# Patient Record
Sex: FEMALE | Race: WHITE | NOT HISPANIC OR LATINO | Employment: FULL TIME | URBAN - METROPOLITAN AREA
[De-identification: names, ages, dates, MRNs, and addresses within clinical notes are randomized per-mention and may not be internally consistent; named-entity substitution may affect disease eponyms.]

---

## 2023-05-15 DIAGNOSIS — F32.A ANXIETY AND DEPRESSION: ICD-10-CM

## 2023-05-15 DIAGNOSIS — F41.9 ANXIETY AND DEPRESSION: ICD-10-CM

## 2023-05-15 DIAGNOSIS — G25.81 RESTLESS LEG SYNDROME: ICD-10-CM

## 2023-05-15 PROBLEM — J32.9 CHRONIC SINUSITIS: Status: ACTIVE | Noted: 2023-05-15

## 2023-05-15 PROBLEM — E78.5 HLD (HYPERLIPIDEMIA): Status: ACTIVE | Noted: 2023-05-15

## 2023-05-15 PROBLEM — G47.00 INSOMNIA: Status: ACTIVE | Noted: 2023-05-15

## 2023-05-15 PROBLEM — J44.9 COPD (CHRONIC OBSTRUCTIVE PULMONARY DISEASE) (MULTI): Status: ACTIVE | Noted: 2023-05-15

## 2023-05-15 PROBLEM — E66.01 MORBID OBESITY (MULTI): Status: ACTIVE | Noted: 2023-05-15

## 2023-05-15 PROBLEM — M19.019 OSTEOARTHRITIS OF STERNOCLAVICULAR JOINT: Status: ACTIVE | Noted: 2023-05-15

## 2023-05-15 PROBLEM — L73.2 HIDRADENITIS SUPPURATIVA: Status: ACTIVE | Noted: 2023-05-15

## 2023-05-15 PROBLEM — J01.90 ACUTE SINUS INFECTION: Status: ACTIVE | Noted: 2023-05-15

## 2023-05-15 PROBLEM — K21.9 GERD (GASTROESOPHAGEAL REFLUX DISEASE): Status: ACTIVE | Noted: 2023-05-15

## 2023-05-15 RX ORDER — CITALOPRAM 40 MG/1
40 TABLET, FILM COATED ORAL DAILY
COMMUNITY
End: 2023-05-15 | Stop reason: SDUPTHER

## 2023-05-15 RX ORDER — CITALOPRAM 40 MG/1
40 TABLET, FILM COATED ORAL DAILY
Qty: 30 TABLET | Refills: 0 | Status: SHIPPED | OUTPATIENT
Start: 2023-05-15 | End: 2023-07-13 | Stop reason: SDUPTHER

## 2023-05-15 RX ORDER — PRAMIPEXOLE DIHYDROCHLORIDE 0.5 MG/1
0.5 TABLET ORAL NIGHTLY
COMMUNITY
End: 2023-05-15 | Stop reason: SDUPTHER

## 2023-05-15 RX ORDER — PRAMIPEXOLE DIHYDROCHLORIDE 0.5 MG/1
0.5 TABLET ORAL NIGHTLY
Qty: 30 TABLET | Refills: 0 | Status: SHIPPED | OUTPATIENT
Start: 2023-05-15 | End: 2023-07-13 | Stop reason: SDUPTHER

## 2023-07-13 DIAGNOSIS — G47.00 INSOMNIA, UNSPECIFIED TYPE: ICD-10-CM

## 2023-07-13 DIAGNOSIS — G25.81 RESTLESS LEG SYNDROME: ICD-10-CM

## 2023-07-13 DIAGNOSIS — F41.9 ANXIETY AND DEPRESSION: ICD-10-CM

## 2023-07-13 DIAGNOSIS — F32.A ANXIETY AND DEPRESSION: ICD-10-CM

## 2023-07-13 RX ORDER — CITALOPRAM 40 MG/1
40 TABLET, FILM COATED ORAL DAILY
Qty: 30 TABLET | Refills: 0 | Status: SHIPPED | OUTPATIENT
Start: 2023-07-13 | End: 2023-08-16 | Stop reason: SDUPTHER

## 2023-07-13 RX ORDER — DIPHENHYDRAMINE HCL 25 MG
25 CAPSULE ORAL NIGHTLY PRN
Qty: 30 CAPSULE | Refills: 0 | Status: SHIPPED | OUTPATIENT
Start: 2023-07-13 | End: 2024-05-16 | Stop reason: WASHOUT

## 2023-07-13 RX ORDER — PRAMIPEXOLE DIHYDROCHLORIDE 0.5 MG/1
0.5 TABLET ORAL NIGHTLY
Qty: 30 TABLET | Refills: 0 | Status: SHIPPED | OUTPATIENT
Start: 2023-07-13 | End: 2023-08-16 | Stop reason: SDUPTHER

## 2023-07-13 RX ORDER — DIPHENHYDRAMINE HCL 25 MG
25 CAPSULE ORAL NIGHTLY PRN
COMMUNITY
Start: 2022-06-01 | End: 2023-07-13 | Stop reason: SDUPTHER

## 2023-07-13 RX ORDER — ALBUTEROL SULFATE 90 UG/1
2 AEROSOL, METERED RESPIRATORY (INHALATION) EVERY 4 HOURS PRN
COMMUNITY
End: 2023-08-16 | Stop reason: SDUPTHER

## 2023-07-13 NOTE — TELEPHONE ENCOUNTER
Needs to have citalopram (CeleXA) 40 mg tablet, pramipexole (Mirapex) 0.5 mg tablet, benedryl, protonix, and ventolin inhaler. Send to [facility]

## 2023-08-16 DIAGNOSIS — F32.A ANXIETY AND DEPRESSION: ICD-10-CM

## 2023-08-16 DIAGNOSIS — G25.81 RESTLESS LEG SYNDROME: ICD-10-CM

## 2023-08-16 DIAGNOSIS — K21.9 GASTROESOPHAGEAL REFLUX DISEASE, UNSPECIFIED WHETHER ESOPHAGITIS PRESENT: ICD-10-CM

## 2023-08-16 DIAGNOSIS — F41.9 ANXIETY AND DEPRESSION: ICD-10-CM

## 2023-08-16 DIAGNOSIS — J44.9 CHRONIC OBSTRUCTIVE PULMONARY DISEASE, UNSPECIFIED COPD TYPE (MULTI): ICD-10-CM

## 2023-08-16 RX ORDER — CITALOPRAM 40 MG/1
40 TABLET, FILM COATED ORAL DAILY
Qty: 90 TABLET | Refills: 0 | Status: SHIPPED | OUTPATIENT
Start: 2023-08-16 | End: 2024-03-08 | Stop reason: SDUPTHER

## 2023-08-16 RX ORDER — PRAMIPEXOLE DIHYDROCHLORIDE 0.5 MG/1
0.5 TABLET ORAL NIGHTLY
Qty: 90 TABLET | Refills: 0 | Status: SHIPPED | OUTPATIENT
Start: 2023-08-16 | End: 2024-03-08 | Stop reason: SDUPTHER

## 2023-08-16 RX ORDER — OMEPRAZOLE 20 MG/1
20 CAPSULE, DELAYED RELEASE ORAL
COMMUNITY
End: 2023-08-16 | Stop reason: SDUPTHER

## 2023-08-16 RX ORDER — OMEPRAZOLE 20 MG/1
20 CAPSULE, DELAYED RELEASE ORAL
Qty: 90 CAPSULE | Refills: 0 | Status: SHIPPED | OUTPATIENT
Start: 2023-08-16 | End: 2024-04-29 | Stop reason: SDUPTHER

## 2023-08-16 RX ORDER — ALBUTEROL SULFATE 90 UG/1
2 AEROSOL, METERED RESPIRATORY (INHALATION) EVERY 4 HOURS PRN
Qty: 18 G | Refills: 1 | Status: SHIPPED | OUTPATIENT
Start: 2023-08-16 | End: 2024-04-29 | Stop reason: SDUPTHER

## 2023-08-16 NOTE — TELEPHONE ENCOUNTER
Patient requesting refills on Citalopram,  Mirapex, Protonix, inhaler send to [facility] in [facility]

## 2024-03-08 DIAGNOSIS — F41.9 ANXIETY AND DEPRESSION: ICD-10-CM

## 2024-03-08 DIAGNOSIS — F32.A ANXIETY AND DEPRESSION: ICD-10-CM

## 2024-03-08 DIAGNOSIS — G25.81 RESTLESS LEG SYNDROME: ICD-10-CM

## 2024-03-08 RX ORDER — PRAMIPEXOLE DIHYDROCHLORIDE 0.5 MG/1
0.5 TABLET ORAL NIGHTLY
Qty: 90 TABLET | Refills: 0 | Status: SHIPPED | OUTPATIENT
Start: 2024-03-08 | End: 2024-04-29 | Stop reason: SDUPTHER

## 2024-03-08 RX ORDER — CITALOPRAM 40 MG/1
40 TABLET, FILM COATED ORAL DAILY
Qty: 90 TABLET | Refills: 0 | Status: SHIPPED | OUTPATIENT
Start: 2024-03-08 | End: 2024-04-29 | Stop reason: SDUPTHER

## 2024-03-08 NOTE — TELEPHONE ENCOUNTER
Rx Refill Request Telephone Encounter    Name:  Ivette Murray  :  [id number]  Medication Name:  pramipexole (Mirapex) 0.5 mg tablet   Medication Name: citalopram (CeleXA) 40 mg tablet   Specific Pharmacy location:  [facility] - [address]    Date of last appointment:  23  Date of next appointment:  24  Best number to reach patient:  [phone]

## 2024-04-26 ENCOUNTER — TELEPHONE (OUTPATIENT)
Dept: PRIMARY CARE | Facility: CLINIC | Age: 49
End: 2024-04-26

## 2024-04-26 ENCOUNTER — DOCUMENTATION (OUTPATIENT)
Dept: PRIMARY CARE | Facility: CLINIC | Age: 49
End: 2024-04-26

## 2024-04-26 NOTE — TELEPHONE ENCOUNTER
----- Message from Emiliana Isidro sent at 4/26/2024 10:26 AM EDT -----  Regarding: Phys/ER f/u  Pt called because she needs to reschedule her physical that she missed today. She also needs an hospital follow up. She would like to come Monday afternoon. Should I schedule these separate or together?

## 2024-04-26 NOTE — TELEPHONE ENCOUNTER
I scheduled her for Monday afternoon for her hospital follow up and told her Billie would schedule her px after her appt on Monday.

## 2024-04-26 NOTE — PROGRESS NOTES
Discharge Facility: [facility]  Discharge Diagnosis: Pneumonia due to infectious organism  Admission Date: 4/18/2024  Discharge Date: 4/25/2024    PCP Appointment Date:  -4/26/2024 1000    Hospital Encounter and Summary: Linked     Pt has PCP appt within 2 business days of discharge. No outreach attempt made.

## 2024-04-29 ENCOUNTER — OFFICE VISIT (OUTPATIENT)
Dept: PRIMARY CARE | Facility: CLINIC | Age: 49
End: 2024-04-29
Payer: COMMERCIAL

## 2024-04-29 VITALS
WEIGHT: 293 LBS | SYSTOLIC BLOOD PRESSURE: 120 MMHG | DIASTOLIC BLOOD PRESSURE: 86 MMHG | OXYGEN SATURATION: 95 % | BODY MASS INDEX: 47.09 KG/M2 | HEIGHT: 66 IN | TEMPERATURE: 97.3 F | HEART RATE: 83 BPM | RESPIRATION RATE: 14 BRPM

## 2024-04-29 DIAGNOSIS — F41.9 ANXIETY AND DEPRESSION: ICD-10-CM

## 2024-04-29 DIAGNOSIS — J44.9 CHRONIC OBSTRUCTIVE PULMONARY DISEASE, UNSPECIFIED COPD TYPE (MULTI): ICD-10-CM

## 2024-04-29 DIAGNOSIS — F32.A ANXIETY AND DEPRESSION: ICD-10-CM

## 2024-04-29 DIAGNOSIS — G25.81 RESTLESS LEG SYNDROME: ICD-10-CM

## 2024-04-29 DIAGNOSIS — K21.9 GASTROESOPHAGEAL REFLUX DISEASE, UNSPECIFIED WHETHER ESOPHAGITIS PRESENT: ICD-10-CM

## 2024-04-29 DIAGNOSIS — G47.09 OTHER INSOMNIA: ICD-10-CM

## 2024-04-29 DIAGNOSIS — R11.0 DAILY NAUSEA: Primary | ICD-10-CM

## 2024-04-29 DIAGNOSIS — I82.462 ACUTE DEEP VEIN THROMBOSIS (DVT) OF CALF MUSCLE VEIN OF LEFT LOWER EXTREMITY (MULTI): ICD-10-CM

## 2024-04-29 PROCEDURE — 99496 TRANSJ CARE MGMT HIGH F2F 7D: CPT | Performed by: INTERNAL MEDICINE

## 2024-04-29 RX ORDER — ENOXAPARIN SODIUM 150 MG/ML
150 INJECTION SUBCUTANEOUS EVERY 12 HOURS
COMMUNITY
Start: 2024-04-25 | End: 2024-04-30

## 2024-04-29 RX ORDER — CITALOPRAM 40 MG/1
40 TABLET, FILM COATED ORAL DAILY
Qty: 90 TABLET | Refills: 3 | Status: SHIPPED | OUTPATIENT
Start: 2024-04-29 | End: 2025-04-29

## 2024-04-29 RX ORDER — ALBUTEROL SULFATE 90 UG/1
2 AEROSOL, METERED RESPIRATORY (INHALATION) EVERY 4 HOURS PRN
Qty: 18 G | Refills: 1 | Status: SHIPPED | OUTPATIENT
Start: 2024-04-29

## 2024-04-29 RX ORDER — WARFARIN SODIUM 5 MG/1
5 TABLET ORAL
COMMUNITY
Start: 2024-04-29 | End: 2024-04-29 | Stop reason: SDUPTHER

## 2024-04-29 RX ORDER — BUDESONIDE AND FORMOTEROL FUMARATE DIHYDRATE 80; 4.5 UG/1; UG/1
2 AEROSOL RESPIRATORY (INHALATION) 2 TIMES DAILY
COMMUNITY
Start: 2024-04-25 | End: 2024-05-16 | Stop reason: SDUPTHER

## 2024-04-29 RX ORDER — ONDANSETRON 4 MG/1
4 TABLET, FILM COATED ORAL EVERY 8 HOURS PRN
COMMUNITY
Start: 2024-04-25 | End: 2024-04-29 | Stop reason: SDUPTHER

## 2024-04-29 RX ORDER — TRAZODONE HYDROCHLORIDE 50 MG/1
100 TABLET ORAL NIGHTLY PRN
Qty: 180 TABLET | Refills: 3 | Status: SHIPPED | OUTPATIENT
Start: 2024-04-29 | End: 2025-04-29

## 2024-04-29 RX ORDER — IPRATROPIUM BROMIDE AND ALBUTEROL SULFATE 2.5; .5 MG/3ML; MG/3ML
3 SOLUTION RESPIRATORY (INHALATION) EVERY 4 HOURS PRN
Qty: 180 ML | Refills: 3 | Status: SHIPPED | OUTPATIENT
Start: 2024-04-29

## 2024-04-29 RX ORDER — PRAMIPEXOLE DIHYDROCHLORIDE 0.5 MG/1
0.5 TABLET ORAL NIGHTLY
Qty: 90 TABLET | Refills: 3 | Status: SHIPPED | OUTPATIENT
Start: 2024-04-29 | End: 2025-04-29

## 2024-04-29 RX ORDER — IPRATROPIUM BROMIDE AND ALBUTEROL SULFATE 2.5; .5 MG/3ML; MG/3ML
3 SOLUTION RESPIRATORY (INHALATION) EVERY 4 HOURS PRN
COMMUNITY
Start: 2024-04-25 | End: 2024-04-29 | Stop reason: SDUPTHER

## 2024-04-29 RX ORDER — OMEPRAZOLE 20 MG/1
20 CAPSULE, DELAYED RELEASE ORAL
Qty: 90 CAPSULE | Refills: 3 | Status: SHIPPED | OUTPATIENT
Start: 2024-04-29 | End: 2025-04-29

## 2024-04-29 RX ORDER — AMOXICILLIN AND CLAVULANATE POTASSIUM 875; 125 MG/1; MG/1
875 TABLET, FILM COATED ORAL 2 TIMES DAILY
COMMUNITY
Start: 2024-04-25 | End: 2024-04-30

## 2024-04-29 RX ORDER — ONDANSETRON 4 MG/1
4 TABLET, FILM COATED ORAL EVERY 8 HOURS PRN
Qty: 20 TABLET | Refills: 3 | Status: SHIPPED | OUTPATIENT
Start: 2024-04-29

## 2024-04-29 RX ORDER — WARFARIN SODIUM 5 MG/1
5 TABLET ORAL EVERY OTHER DAY
Qty: 90 TABLET | Refills: 0 | Status: SHIPPED | OUTPATIENT
Start: 2024-04-29 | End: 2024-05-16 | Stop reason: ALTCHOICE

## 2024-04-29 NOTE — PROGRESS NOTES
"Subjective    Ivette Murray is a 49 y.o. female who presents for Hospital Follow-up.  HPI    [facility] follow up   Dx pneumonia, blood clot left leg   Discharged with Lovenox bid she has been on since last Monday  She is feeling much better    Warfarin 5mg daily   INR today at 3.3   Going to [facility]   Has one day left abx and prednisone.   She has not had her mamm or her colonoscopy    Needs refills   Discuss trazodone she cannot sleep. Menopause type sx.     Review of Systems   All other systems reviewed and are negative.        Objective     /86 (BP Location: Left arm, Patient Position: Sitting, BP Cuff Size: Adult)   Pulse 83   Temp 36.3 °C (97.3 °F) (Skin)   Resp 14   Ht 1.676 m (5' 6\")   Wt 135 kg (298 lb)   SpO2 95%   BMI 48.10 kg/m²    Physical Exam  Vitals reviewed.   Constitutional:       General: She is not in acute distress.     Appearance: Normal appearance.   Cardiovascular:      Rate and Rhythm: Normal rate and regular rhythm.      Pulses: Normal pulses.      Heart sounds: Normal heart sounds.   Pulmonary:      Effort: Pulmonary effort is normal.      Breath sounds: Normal breath sounds.   Abdominal:      Tenderness: There is no abdominal tenderness.   Musculoskeletal:         General: No swelling.      Comments: She has tenderness left calf   Skin:     General: Skin is warm and dry.   Neurological:      Mental Status: She is alert.       Health Maintenance Due   Topic Date Due    Echocardiogram  Never done    Yearly Adult Physical  Never done    HIV Screening  Never done    MMR Vaccines (1 of 1 - Standard series) Never done    Pneumococcal Vaccine: Pediatrics (0 to 5 Years) and At-Risk Patients (6 to 64 Years) (1 of 2 - PCV) Never done    Diabetes Screening  Never done    Hepatitis B Vaccines (1 of 3 - 19+ 3-dose series) Never done    DTaP/Tdap/Td Vaccines (1 - Tdap) Never done    Mammogram  Never done    Zoster Vaccines (1 of 2) Never done    Creatinine Level  05/03/2023    " Potassium Level  05/03/2023    COVID-19 Vaccine (4 - 2023-24 season) 09/01/2023    Cervical Cancer Screening  01/14/2024          Assessment/Plan   Problem List Items Addressed This Visit       Anxiety and depression    Relevant Medications    citalopram (CeleXA) 40 mg tablet    COPD (chronic obstructive pulmonary disease) (Multi)    Relevant Medications    albuterol (Ventolin HFA) 90 mcg/actuation inhaler    ipratropium-albuteroL (Duo-Neb) 0.5-2.5 mg/3 mL nebulizer solution    GERD (gastroesophageal reflux disease)    Relevant Medications    omeprazole (PriLOSEC) 20 mg DR capsule    Insomnia    Relevant Medications    traZODone (Desyrel) 50 mg tablet    Restless leg syndrome    Relevant Medications    pramipexole (Mirapex) 0.5 mg tablet     Other Visit Diagnoses       Daily nausea    -  Primary    Relevant Medications    ondansetron (Zofran) 4 mg tablet    Acute deep vein thrombosis (DVT) of calf muscle vein of left lower extremity (Multi)        Relevant Medications    warfarin (Coumadin) 5 mg tablet    cesar.stocking,knee,reg,xlrg misc        Can dc lovenox has been on for  a week.  Never had vte. No family hx of vte  Recommend compression stalkings.   Ok to go back to work  Her lungs sound good.   Recommend she get prevnar 20 she has had pneumonia multiple times.   Cont med  Call  with any problems or questions.   Follow up for physical in three months.

## 2024-04-30 ENCOUNTER — PATIENT OUTREACH (OUTPATIENT)
Dept: CARE COORDINATION | Facility: CLINIC | Age: 49
End: 2024-04-30
Payer: COMMERCIAL

## 2024-05-03 ENCOUNTER — TELEPHONE (OUTPATIENT)
Dept: PRIMARY CARE | Facility: CLINIC | Age: 49
End: 2024-05-03

## 2024-05-09 ENCOUNTER — PATIENT OUTREACH (OUTPATIENT)
Dept: CARE COORDINATION | Facility: CLINIC | Age: 49
End: 2024-05-09
Payer: COMMERCIAL

## 2024-05-09 NOTE — PROGRESS NOTES
Discharge Facility: [facility]  Discharge Diagnosis: Lung nodule  Admission Date: 5/4/2024 (readmit)  Discharge Date: 5/7/2024    PCP Appointment Date:  -Unable to schedule d/t no contact; task sent to office to assist    Hospital Encounter and Summary: Linked     Unable to reach patient x2 attempts, voicemail left with call back number.

## 2024-05-16 ENCOUNTER — OFFICE VISIT (OUTPATIENT)
Dept: PRIMARY CARE | Facility: CLINIC | Age: 49
End: 2024-05-16
Payer: COMMERCIAL

## 2024-05-16 VITALS
WEIGHT: 293 LBS | TEMPERATURE: 98.1 F | DIASTOLIC BLOOD PRESSURE: 72 MMHG | SYSTOLIC BLOOD PRESSURE: 124 MMHG | HEART RATE: 103 BPM | OXYGEN SATURATION: 94 % | RESPIRATION RATE: 17 BRPM | HEIGHT: 66 IN | BODY MASS INDEX: 47.09 KG/M2

## 2024-05-16 DIAGNOSIS — I82.462 ACUTE DEEP VEIN THROMBOSIS (DVT) OF CALF MUSCLE VEIN OF LEFT LOWER EXTREMITY (MULTI): Primary | ICD-10-CM

## 2024-05-16 DIAGNOSIS — T14.8XXA HEMATOMA: ICD-10-CM

## 2024-05-16 DIAGNOSIS — R91.1 LUNG NODULE: ICD-10-CM

## 2024-05-16 DIAGNOSIS — K21.9 GASTROESOPHAGEAL REFLUX DISEASE, UNSPECIFIED WHETHER ESOPHAGITIS PRESENT: ICD-10-CM

## 2024-05-16 DIAGNOSIS — F51.01 PRIMARY INSOMNIA: ICD-10-CM

## 2024-05-16 DIAGNOSIS — J44.9 CHRONIC OBSTRUCTIVE PULMONARY DISEASE, UNSPECIFIED COPD TYPE (MULTI): ICD-10-CM

## 2024-05-16 PROCEDURE — 99214 OFFICE O/P EST MOD 30 MIN: CPT | Performed by: INTERNAL MEDICINE

## 2024-05-16 RX ORDER — ACETAMINOPHEN 500 MG
1000 TABLET ORAL EVERY 4 HOURS PRN
Qty: 60 TABLET | Refills: 5 | Status: SHIPPED | OUTPATIENT
Start: 2024-05-16 | End: 2024-05-26

## 2024-05-16 RX ORDER — BUDESONIDE AND FORMOTEROL FUMARATE DIHYDRATE 80; 4.5 UG/1; UG/1
2 AEROSOL RESPIRATORY (INHALATION)
Qty: 10.2 G | Refills: 0 | Status: SHIPPED | OUTPATIENT
Start: 2024-05-16

## 2024-05-16 NOTE — PROGRESS NOTES
"Subjective   Patient ID: Ivette Murray is a 49 y.o. female who presents for Follow-up.    HPI     Was in hospital 5/4/24-5/7/24 ([facility]) found lung nodule.  Hospital changed her medications.  Here for hospital follow up and advice.     Has been tapering down on cigarettes.  She is going to lung nodule clinic  She is on eliquis  She is breathing  she is only using o2 atnight  On eliquis.      Review of Systems   All other systems reviewed and are negative.      Objective   /72 (BP Location: Right arm, Patient Position: Sitting, BP Cuff Size: Large adult)   Pulse 103   Temp 36.7 °C (98.1 °F) (Skin)   Resp 17   Ht 1.676 m (5' 6\")   Wt 136 kg (299 lb)   SpO2 94%   BMI 48.26 kg/m²     Physical Exam  Vitals reviewed.   Constitutional:       General: She is not in acute distress.     Appearance: Normal appearance.   Cardiovascular:      Rate and Rhythm: Normal rate and regular rhythm.      Pulses: Normal pulses.      Heart sounds: Normal heart sounds.   Pulmonary:      Effort: Pulmonary effort is normal.      Breath sounds: Normal breath sounds.   Abdominal:      General: Abdomen is flat.      Palpations: Abdomen is soft. There is no mass.      Tenderness: There is no abdominal tenderness.      Comments: Resolving hematomas lower abdomen.   Musculoskeletal:         General: No swelling.   Skin:     General: Skin is warm and dry.   Neurological:      Mental Status: She is alert.         Assessment/Plan   Problem List Items Addressed This Visit             ICD-10-CM    COPD (chronic obstructive pulmonary disease) (Multi) J44.9    Relevant Medications    budesonide-formoteroL (Symbicort) 80-4.5 mcg/actuation inhaler    GERD (gastroesophageal reflux disease) K21.9    Insomnia G47.00     Other Visit Diagnoses         Codes    Acute deep vein thrombosis (DVT) of calf muscle vein of left lower extremity (Multi)    -  Primary I82.462    Relevant Medications    acetaminophen (Tylenol Extra Strength) 500 " mg tablet    Hematoma     T14.8XXA    Lung nodule     R91.1        Labs and imagining reviewed  She is improving off the coumadin now on eliquis.   Tylenol only for any pain.   Her hematomas are improving.  She has small lung nodule. Recommend she continue follow up with lung nodule clinic at [facility]  Recommend she stop smoking. She is working hard at  it  Cont her blood thinner.  Cont oxygen at night  Call  with any problems or questions.   Recommend she get her prevnar at the pharmacy  Follow up in July

## 2024-05-23 ENCOUNTER — PATIENT OUTREACH (OUTPATIENT)
Dept: CARE COORDINATION | Facility: CLINIC | Age: 49
End: 2024-05-23
Payer: COMMERCIAL

## 2024-06-05 DIAGNOSIS — G25.81 RESTLESS LEG SYNDROME: ICD-10-CM

## 2024-06-05 DIAGNOSIS — I82.462 ACUTE DEEP VEIN THROMBOSIS (DVT) OF CALF MUSCLE VEIN OF LEFT LOWER EXTREMITY (MULTI): ICD-10-CM

## 2024-06-05 NOTE — TELEPHONE ENCOUNTER
Pt needs the prescription rewritten for compression socks. The pharmacy told her the prescription needs to include the strength of the socks, and it needs to include two pairs.    Pt would also like to know the status of the [employer] papers she sent in last week.    Pt needs the following prescription refilled, she only has 1 left:  Medication Name:  apixaban (Eliquis) 5 mg tablet   Specific Pharmacy location:  [facility] [address] [place] [address]   Date of last appointment:  05/16/24  Date of next appointment:  07/31/24  Best number to reach patient:  [phone]

## 2024-07-31 ENCOUNTER — APPOINTMENT (OUTPATIENT)
Dept: PRIMARY CARE | Facility: CLINIC | Age: 49
End: 2024-07-31
Payer: COMMERCIAL

## 2024-08-14 ENCOUNTER — TELEPHONE (OUTPATIENT)
Dept: PRIMARY CARE | Facility: CLINIC | Age: 49
End: 2024-08-14
Payer: COMMERCIAL

## 2024-08-14 DIAGNOSIS — J44.9 CHRONIC OBSTRUCTIVE PULMONARY DISEASE, UNSPECIFIED COPD TYPE (MULTI): ICD-10-CM

## 2024-08-14 RX ORDER — ALBUTEROL SULFATE 90 UG/1
2 INHALANT RESPIRATORY (INHALATION) EVERY 4 HOURS PRN
Qty: 18 G | Refills: 1 | Status: SHIPPED | OUTPATIENT
Start: 2024-08-14

## 2024-08-14 RX ORDER — BUDESONIDE AND FORMOTEROL FUMARATE DIHYDRATE 80; 4.5 UG/1; UG/1
2 AEROSOL RESPIRATORY (INHALATION)
Qty: 10.2 G | Refills: 0 | Status: SHIPPED | OUTPATIENT
Start: 2024-08-14

## 2024-09-10 DIAGNOSIS — J44.9 CHRONIC OBSTRUCTIVE PULMONARY DISEASE, UNSPECIFIED COPD TYPE (MULTI): ICD-10-CM

## 2024-09-11 RX ORDER — DILTIAZEM HYDROCHLORIDE 60 MG/1
2 TABLET, FILM COATED ORAL 2 TIMES DAILY
Qty: 10.2 G | Refills: 11 | Status: SHIPPED | OUTPATIENT
Start: 2024-09-11

## 2024-09-17 DIAGNOSIS — J44.9 CHRONIC OBSTRUCTIVE PULMONARY DISEASE, UNSPECIFIED COPD TYPE (MULTI): ICD-10-CM

## 2024-09-17 RX ORDER — ALBUTEROL SULFATE 90 UG/1
2 INHALANT RESPIRATORY (INHALATION) EVERY 4 HOURS PRN
Qty: 18 G | Refills: 3 | Status: SHIPPED | OUTPATIENT
Start: 2024-09-17

## 2024-11-04 DIAGNOSIS — E04.1 THYROID NODULE: Primary | ICD-10-CM

## 2024-11-19 ENCOUNTER — HOSPITAL ENCOUNTER (OUTPATIENT)
Dept: RADIOLOGY | Facility: CLINIC | Age: 49
Discharge: HOME | End: 2024-11-19
Payer: COMMERCIAL

## 2024-11-19 DIAGNOSIS — E04.1 THYROID NODULE: ICD-10-CM

## 2024-11-19 PROCEDURE — 76536 US EXAM OF HEAD AND NECK: CPT | Performed by: RADIOLOGY

## 2024-11-19 PROCEDURE — 76536 US EXAM OF HEAD AND NECK: CPT

## 2024-11-22 ENCOUNTER — TELEPHONE (OUTPATIENT)
Dept: PRIMARY CARE | Facility: CLINIC | Age: 49
End: 2024-11-22
Payer: COMMERCIAL

## 2024-11-22 DIAGNOSIS — E04.1 THYROID NODULE: Primary | ICD-10-CM

## 2024-11-22 NOTE — TELEPHONE ENCOUNTER
----- Message from Love Phan sent at 11/22/2024 11:52 AM EST -----  Us shows lesion that needs biopsied. Refer to dr. Kapadia please help her ser it up

## 2024-12-09 ENCOUNTER — OFFICE VISIT (OUTPATIENT)
Dept: OTOLARYNGOLOGY | Facility: CLINIC | Age: 49
End: 2024-12-09
Payer: COMMERCIAL

## 2024-12-09 VITALS — BODY MASS INDEX: 47.09 KG/M2 | WEIGHT: 293 LBS | HEIGHT: 66 IN | TEMPERATURE: 97.7 F

## 2024-12-09 DIAGNOSIS — E04.1 THYROID NODULE: ICD-10-CM

## 2024-12-09 DIAGNOSIS — R53.83 FATIGUE, UNSPECIFIED TYPE: Primary | ICD-10-CM

## 2024-12-09 PROCEDURE — 31575 DIAGNOSTIC LARYNGOSCOPY: CPT | Performed by: STUDENT IN AN ORGANIZED HEALTH CARE EDUCATION/TRAINING PROGRAM

## 2024-12-09 PROCEDURE — 3008F BODY MASS INDEX DOCD: CPT | Performed by: STUDENT IN AN ORGANIZED HEALTH CARE EDUCATION/TRAINING PROGRAM

## 2024-12-09 PROCEDURE — 99214 OFFICE O/P EST MOD 30 MIN: CPT | Performed by: STUDENT IN AN ORGANIZED HEALTH CARE EDUCATION/TRAINING PROGRAM

## 2024-12-09 PROCEDURE — 99204 OFFICE O/P NEW MOD 45 MIN: CPT | Performed by: STUDENT IN AN ORGANIZED HEALTH CARE EDUCATION/TRAINING PROGRAM

## 2024-12-09 ASSESSMENT — PATIENT HEALTH QUESTIONNAIRE - PHQ9
SUM OF ALL RESPONSES TO PHQ9 QUESTIONS 1 AND 2: 0
1. LITTLE INTEREST OR PLEASURE IN DOING THINGS: NOT AT ALL
2. FEELING DOWN, DEPRESSED OR HOPELESS: NOT AT ALL

## 2024-12-09 ASSESSMENT — PAIN SCALES - GENERAL: PAINLEVEL_OUTOF10: 0-NO PAIN

## 2024-12-09 NOTE — LETTER
"December 9, 2024     Love Phan DO  [id number] [facility] [id number]    Patient: Maurilio Murray   YOB: 1975   Date of Visit: 12/9/2024       Dear Dr. Love Phan DO:    Thank you for referring Maurilio Murray to me for evaluation. Below are my notes for this consultation.  If you have questions, please do not hesitate to call me. I look forward to following your patient along with you.       Sincerely,     Scarlett Kinsey MD      CC: No Recipients  ______________________________________________________________________________________    HEAD AND NECK SURGERY CONSULT  [facility]    Referring Provider: Ken ROY  I had the pleasure of seeing Ivette Murray as a consultation today for evaluation of thyroid nodules.     The patient reports a history of imaging showing thyroid nodules. She has not noticed any compressive symptoms, no dysphagia. Some changes in voice with more hoarseness. No breathing difficulty. Having fatigue, weight gain and feeling 'slowed down'. Concerned that these symptoms could be due to her thyroid    Denies personal history of radiation to the neck, family history of thyroid disease or cancer.     The patient denies having prior trauma or surgery to their head and neck. There is not a personal or family history of blood clots, easy bleeding or bruising, or anesthesia concerns.     Tobacco use: The patient  reports that she has been smoking cigarettes. She has never used smokeless tobacco.   Alcohol Use: The patient  reports no history of alcohol use.     Physical Examination  Vitals:  Temp 36.5 °C (97.7 °F)   Ht 1.676 m (5' 6\")   Wt 137 kg (302 lb)   BMI 48.74 kg/m²   General: Examination reveals a well-developed, well-nourished patient in no apparent distress.  The patient has no audible dysphonia, stridor or airway distress.  The patient is oriented, alert and responsive.    Oral Cavity:  The patient is able to open the " mouth widely without trismus.  The floor of mouth and oral tongue are soft, and no mucosal abnormalities are noted on the lips or within the oral cavity.  The oral tongue is fully mobile and midline on protrusion.  T  Oropharynx: There are no mucosal abnormalities noted within the oropharynx.  The soft palate elevates symmetrically, the tonsils and base of tongue are normal to inspection and palpation.       Salivary glands: There are no palpable masses of the parotid or submandibular glands.   Neuro: Cranial nerves 2-12 are without obvious abnormality.  Neck: The neck is soft and symmetric.  There is no palpable adenopathy.   Thyroid: not enlarged, nontender     Procedure Note:  Diagnostic Flexible Laryngoscopy (58990)  Indication: patient symptoms requiring evaluation of pharyngeal/laryngeal/hypopharyngeal structures  Surgeon: Scarlett Kinsey MD  Informed Consent: The procedure, risks, and benefits were discussed with the patient and verbal consent obtained to proceed.   Procedure: Topical anesthesia (lidocaine) was used to spray the nasal mucosa.  A flexible laryngoscopy was inserted through the nasal cavity. The scope was then passed through the nasopharynx, oropharynx, and supraglottis. The vallecula, hypopharynx, supraglottis, glottis and subglottis were then visualized. The scope was then removed. The patient tolerated the procedure with no complications.     Findings: All of the visualized areas noted above were noted to be without evidence of lesions, ulcerations or masses.  The vocal folds adduct and abduct fully.     Attestation: I performed the procedure in its entirety, or was present with the resident/fellow for the entirety of the procedure.      DATA REVIEWED:  Labs:  Lab Results   Component Value Date    WBC 7.7 05/03/2022    HGB 11.6 (L) 05/03/2022    HCT 37.2 05/03/2022     05/03/2022    NEUTROABS 4.46 05/03/2022     Lab Results   Component Value Date    TSH 1.97 11/24/2020         Radiology: I personally reviewed the US thyroid from 11/19/24 which demonstrated right 2.2 cm TR4 nodule; right 1.1 cm TR3 nodule.     Review of prior medical records: I reviewed the patient's medical records which included a clinic note from Dr. Ken Phan regarding follow up of lung nodule with incidental thyroid nodule on US     ASSESSMENT and PLAN:    Thyroid Nodule(s)  - Reviewed work up and treatment options, as well as exam findings today  - Recommend FNA of right TR4 2.2 cm nodule. Reviewed indications for biopsy and next steps  - Recommend TSH with reflex T4 given symptoms, if normal will defer to PCP for further work up of fatigue and weight gain as indicated  - Briefly discussed thyroidectomy risks and benefits including pain, bleeding, infection, scar, need for further procedures, post operative hypoparathyroidism and/or hypocalcemia, need for thyroid supplementation, damage to surrounding structures including the recurrent laryngeal nerve, changes in voice, breathing and swallow, and risks of anesthesia.   - Will follow up after biopsy results    Scarlett Kinsey MD

## 2024-12-09 NOTE — PROGRESS NOTES
"HEAD AND NECK SURGERY CONSULT  [facility]    Referring Provider: Ken Phan    HPI  I had the pleasure of seeing Ivette Murray as a consultation today for evaluation of thyroid nodules.     The patient reports a history of imaging showing thyroid nodules. She has not noticed any compressive symptoms, no dysphagia. Some changes in voice with more hoarseness. No breathing difficulty. Having fatigue, weight gain and feeling 'slowed down'. Concerned that these symptoms could be due to her thyroid    Denies personal history of radiation to the neck, family history of thyroid disease or cancer.     The patient denies having prior trauma or surgery to their head and neck. There is not a personal or family history of blood clots, easy bleeding or bruising, or anesthesia concerns.     Tobacco use: The patient  reports that she has been smoking cigarettes. She has never used smokeless tobacco.   Alcohol Use: The patient  reports no history of alcohol use.     Physical Examination  Vitals:  Temp 36.5 °C (97.7 °F)   Ht 1.676 m (5' 6\")   Wt 137 kg (302 lb)   BMI 48.74 kg/m²   General: Examination reveals a well-developed, well-nourished patient in no apparent distress.  The patient has no audible dysphonia, stridor or airway distress.  The patient is oriented, alert and responsive.    Oral Cavity:  The patient is able to open the mouth widely without trismus.  The floor of mouth and oral tongue are soft, and no mucosal abnormalities are noted on the lips or within the oral cavity.  The oral tongue is fully mobile and midline on protrusion.  T  Oropharynx: There are no mucosal abnormalities noted within the oropharynx.  The soft palate elevates symmetrically, the tonsils and base of tongue are normal to inspection and palpation.       Salivary glands: There are no palpable masses of the parotid or submandibular glands.   Neuro: Cranial nerves 2-12 are without obvious abnormality.  Neck: The neck is " soft and symmetric.  There is no palpable adenopathy.   Thyroid: not enlarged, nontender     Procedure Note:  Diagnostic Flexible Laryngoscopy (49000)  Indication: patient symptoms requiring evaluation of pharyngeal/laryngeal/hypopharyngeal structures  Surgeon: Scarlett Kinsey MD  Informed Consent: The procedure, risks, and benefits were discussed with the patient and verbal consent obtained to proceed.   Procedure: Topical anesthesia (lidocaine) was used to spray the nasal mucosa.  A flexible laryngoscopy was inserted through the nasal cavity. The scope was then passed through the nasopharynx, oropharynx, and supraglottis. The vallecula, hypopharynx, supraglottis, glottis and subglottis were then visualized. The scope was then removed. The patient tolerated the procedure with no complications.     Findings: All of the visualized areas noted above were noted to be without evidence of lesions, ulcerations or masses.  The vocal folds adduct and abduct fully.     Attestation: I performed the procedure in its entirety, or was present with the resident/fellow for the entirety of the procedure.      DATA REVIEWED:  Labs:  Lab Results   Component Value Date    WBC 7.7 05/03/2022    HGB 11.6 (L) 05/03/2022    HCT 37.2 05/03/2022     05/03/2022    NEUTROABS 4.46 05/03/2022     Lab Results   Component Value Date    TSH 1.97 11/24/2020        Radiology: I personally reviewed the US thyroid from 11/19/24 which demonstrated right 2.2 cm TR4 nodule; right 1.1 cm TR3 nodule.     Review of prior medical records: I reviewed the patient's medical records which included a clinic note from Dr. Ken Phan regarding follow up of lung nodule with incidental thyroid nodule on US     ASSESSMENT and PLAN:    Thyroid Nodule(s)  - Reviewed work up and treatment options, as well as exam findings today  - Recommend FNA of right TR4 2.2 cm nodule. Reviewed indications for biopsy and next steps  - Recommend TSH with reflex T4 given  symptoms, if normal will defer to PCP for further work up of fatigue and weight gain as indicated  - Briefly discussed thyroidectomy risks and benefits including pain, bleeding, infection, scar, need for further procedures, post operative hypoparathyroidism and/or hypocalcemia, need for thyroid supplementation, damage to surrounding structures including the recurrent laryngeal nerve, changes in voice, breathing and swallow, and risks of anesthesia.   - Will follow up after biopsy results    Scarlett Kinsey MD

## 2024-12-26 ENCOUNTER — APPOINTMENT (OUTPATIENT)
Dept: RADIOLOGY | Facility: HOSPITAL | Age: 49
End: 2024-12-26
Payer: COMMERCIAL

## 2024-12-31 ENCOUNTER — HOSPITAL ENCOUNTER (OUTPATIENT)
Dept: RADIOLOGY | Facility: HOSPITAL | Age: 49
Discharge: HOME | End: 2024-12-31
Payer: COMMERCIAL

## 2024-12-31 VITALS
HEART RATE: 80 BPM | OXYGEN SATURATION: 92 % | SYSTOLIC BLOOD PRESSURE: 171 MMHG | DIASTOLIC BLOOD PRESSURE: 61 MMHG | RESPIRATION RATE: 20 BRPM

## 2024-12-31 DIAGNOSIS — E04.1 THYROID NODULE: ICD-10-CM

## 2024-12-31 PROCEDURE — 2500000004 HC RX 250 GENERAL PHARMACY W/ HCPCS (ALT 636 FOR OP/ED): Performed by: NURSE PRACTITIONER

## 2024-12-31 PROCEDURE — 76942 ECHO GUIDE FOR BIOPSY: CPT

## 2024-12-31 PROCEDURE — 10005 FNA BX W/US GDN 1ST LES: CPT

## 2024-12-31 RX ORDER — LIDOCAINE HYDROCHLORIDE 10 MG/ML
INJECTION, SOLUTION EPIDURAL; INFILTRATION; INTRACAUDAL; PERINEURAL
Status: COMPLETED | OUTPATIENT
Start: 2024-12-31 | End: 2024-12-31

## 2024-12-31 RX ADMIN — LIDOCAINE HYDROCHLORIDE 4 ML: 10 INJECTION, SOLUTION EPIDURAL; INFILTRATION; INTRACAUDAL; PERINEURAL at 14:11

## 2024-12-31 ASSESSMENT — PAIN SCALES - GENERAL: PAINLEVEL_OUTOF10: 0 - NO PAIN

## 2024-12-31 NOTE — Clinical Note
Left thyroid nodule fine needle aspiration completed. Three passes obtained and sent to lab for cytology, Pt requesting to leave immediately following biopsy. Advised we would like to keep her for 15 minutes to ensure she has no adverse effects following biopsy. Pt refused. Band aid to left neck dry and intact. Pt denies complaints or pain. Refused ice pack for discharge. Discharged to home accompanied by friend in good condition.

## 2024-12-31 NOTE — POST-PROCEDURE NOTE
Interventional Radiology Brief Postprocedure Note    Procedure: Left thyroid nodule biopsy    Provider: MARGARITA Herndon-CNP    Assistant: None    Diagnosis: Left thyroid nodule    Description of procedure: Using ultrasound guidance a total of 3 passes were made with 25 gauge spinal needle to the left thyroid nodule. Scanning after each pass demonstrated no evidence of significant postprocedure bleeding. Diagnostic specimen sent to lab for analysis. Please refer to the full radiology report for further details.         Medications (Filter: Administrations occurring from 1357 to 1419 on 12/31/24) As of 12/31/24 1419      lidocaine PF (Xylocaine) 10 mg/mL (1 %) injection (mL) Total volume:  4 mL      Date/Time Rate/Dose/Volume Action       12/31/24  1411 4 mL Given                     Complications: None    Estimated Blood Loss: none        See detailed result report with images in PACS.    The patient tolerated the procedure well without incident or complication and is in stable condition.

## 2025-01-02 LAB
LABORATORY COMMENT REPORT: NORMAL
LABORATORY COMMENT REPORT: NORMAL
PATH REPORT.FINAL DX SPEC: NORMAL
PATH REPORT.GROSS SPEC: NORMAL
PATH REPORT.TOTAL CANCER: NORMAL

## 2025-03-24 DIAGNOSIS — G47.09 OTHER INSOMNIA: ICD-10-CM

## 2025-03-24 RX ORDER — TRAZODONE HYDROCHLORIDE 50 MG/1
100 TABLET ORAL NIGHTLY PRN
Qty: 180 TABLET | Refills: 3 | Status: SHIPPED | OUTPATIENT
Start: 2025-03-24 | End: 2026-03-24

## 2025-07-14 ENCOUNTER — TELEPHONE (OUTPATIENT)
Dept: PRIMARY CARE | Facility: CLINIC | Age: 50
End: 2025-07-14
Payer: COMMERCIAL

## 2025-07-14 DIAGNOSIS — J44.9 CHRONIC OBSTRUCTIVE PULMONARY DISEASE, UNSPECIFIED COPD TYPE (MULTI): ICD-10-CM

## 2025-07-14 DIAGNOSIS — G25.81 RESTLESS LEG SYNDROME: ICD-10-CM

## 2025-07-14 DIAGNOSIS — I82.462 ACUTE DEEP VEIN THROMBOSIS (DVT) OF CALF MUSCLE VEIN OF LEFT LOWER EXTREMITY: ICD-10-CM

## 2025-07-14 RX ORDER — PRAMIPEXOLE DIHYDROCHLORIDE 0.5 MG/1
0.5 TABLET ORAL NIGHTLY
Qty: 90 TABLET | Refills: 3 | Status: SHIPPED | OUTPATIENT
Start: 2025-07-14 | End: 2026-07-14

## 2025-07-14 RX ORDER — BUDESONIDE AND FORMOTEROL FUMARATE DIHYDRATE 80; 4.5 UG/1; UG/1
2 AEROSOL RESPIRATORY (INHALATION) 2 TIMES DAILY
Qty: 10.2 G | Refills: 11 | Status: SHIPPED | OUTPATIENT
Start: 2025-07-14 | End: 2025-07-16 | Stop reason: SDUPTHER

## 2025-07-14 RX ORDER — ALBUTEROL SULFATE 90 UG/1
2 INHALANT RESPIRATORY (INHALATION) EVERY 4 HOURS PRN
Qty: 18 G | Refills: 3 | Status: SHIPPED | OUTPATIENT
Start: 2025-07-14

## 2025-07-14 NOTE — TELEPHONE ENCOUNTER
Eliquis, Mirapex, Protonix, Symbicort, albuterol (feels this don't work is there something else that can be ordered?)  Please send all to [facility]

## 2025-07-16 DIAGNOSIS — J44.9 CHRONIC OBSTRUCTIVE PULMONARY DISEASE, UNSPECIFIED COPD TYPE (MULTI): ICD-10-CM

## 2025-07-16 RX ORDER — DILTIAZEM HYDROCHLORIDE 60 MG/1
2 TABLET, FILM COATED ORAL 2 TIMES DAILY
Qty: 10.2 G | Refills: 1 | Status: SHIPPED | OUTPATIENT
Start: 2025-07-16

## 2025-07-16 NOTE — TELEPHONE ENCOUNTER
Received a PA request for generic symbicort.   Per question set- insurance will cover brand   Needs fu- message sent to schedule

## 2025-08-26 DIAGNOSIS — F32.A ANXIETY AND DEPRESSION: ICD-10-CM

## 2025-08-26 DIAGNOSIS — F41.9 ANXIETY AND DEPRESSION: ICD-10-CM

## 2025-08-26 RX ORDER — CITALOPRAM 40 MG/1
40 TABLET ORAL DAILY
Qty: 90 TABLET | Refills: 0 | Status: SHIPPED | OUTPATIENT
Start: 2025-08-26 | End: 2026-08-26

## 2025-08-27 ENCOUNTER — TELEPHONE (OUTPATIENT)
Dept: PRIMARY CARE | Facility: CLINIC | Age: 50
End: 2025-08-27
Payer: COMMERCIAL

## 2025-09-04 ENCOUNTER — TELEPHONE (OUTPATIENT)
Dept: PRIMARY CARE | Facility: CLINIC | Age: 50
End: 2025-09-04
Payer: COMMERCIAL